# Patient Record
Sex: FEMALE | Race: WHITE | NOT HISPANIC OR LATINO | Employment: FULL TIME | URBAN - METROPOLITAN AREA
[De-identification: names, ages, dates, MRNs, and addresses within clinical notes are randomized per-mention and may not be internally consistent; named-entity substitution may affect disease eponyms.]

---

## 2019-02-22 ENCOUNTER — HOSPITAL ENCOUNTER (OUTPATIENT)
Facility: HOSPITAL | Age: 34
Discharge: HOME | End: 2019-02-22
Attending: OBSTETRICS & GYNECOLOGY | Admitting: NURSE PRACTITIONER

## 2019-02-22 VITALS
TEMPERATURE: 97.8 F | SYSTOLIC BLOOD PRESSURE: 118 MMHG | RESPIRATION RATE: 16 BRPM | BODY MASS INDEX: 29.63 KG/M2 | DIASTOLIC BLOOD PRESSURE: 64 MMHG | WEIGHT: 161 LBS | HEART RATE: 75 BPM | HEIGHT: 62 IN

## 2019-02-22 LAB
BACTERIA UR QL AUTO: ABNORMAL /HPF
BILIRUB UR QL STRIP: NEGATIVE
CLARITY UR: CLEAR
COLOR UR: YELLOW
GLUCOSE UR STRIP-MCNC: NEGATIVE MG/DL
HGB UR QL STRIP.AUTO: NEGATIVE
HYALINE CASTS UR QL AUTO: ABNORMAL /LPF
KETONES UR QL STRIP: NEGATIVE
LEUKOCYTE ESTERASE UR QL STRIP.AUTO: ABNORMAL
NITRITE UR QL STRIP: NEGATIVE
PH UR STRIP.AUTO: 7 [PH] (ref 5–8)
PROT UR QL STRIP: NEGATIVE
RBC # UR: ABNORMAL /HPF
REF LAB TEST METHOD: ABNORMAL
SP GR UR STRIP: <=1.005 (ref 1–1.03)
SQUAMOUS #/AREA URNS HPF: ABNORMAL /HPF
UROBILINOGEN UR QL STRIP: ABNORMAL
WBC UR QL AUTO: ABNORMAL /HPF

## 2019-02-22 PROCEDURE — 81001 URINALYSIS AUTO W/SCOPE: CPT | Performed by: NURSE PRACTITIONER

## 2019-02-22 PROCEDURE — 87086 URINE CULTURE/COLONY COUNT: CPT | Performed by: NURSE PRACTITIONER

## 2019-02-22 PROCEDURE — 99203 OFFICE O/P NEW LOW 30 MIN: CPT | Performed by: OBSTETRICS & GYNECOLOGY

## 2019-02-22 PROCEDURE — 59025 FETAL NON-STRESS TEST: CPT

## 2019-02-22 PROCEDURE — 59025 FETAL NON-STRESS TEST: CPT | Performed by: OBSTETRICS & GYNECOLOGY

## 2019-02-22 PROCEDURE — G0463 HOSPITAL OUTPT CLINIC VISIT: HCPCS

## 2019-02-22 RX ORDER — SODIUM CHLORIDE, SODIUM LACTATE, POTASSIUM CHLORIDE, CALCIUM CHLORIDE 600; 310; 30; 20 MG/100ML; MG/100ML; MG/100ML; MG/100ML
125 INJECTION, SOLUTION INTRAVENOUS CONTINUOUS
Status: DISCONTINUED | OUTPATIENT
Start: 2019-02-22 | End: 2019-02-22 | Stop reason: HOSPADM

## 2019-02-22 RX ORDER — PRENATAL WITH FERROUS FUM AND FOLIC ACID 3080; 920; 120; 400; 22; 1.84; 3; 20; 10; 1; 12; 200; 27; 25; 2 [IU]/1; [IU]/1; MG/1; [IU]/1; MG/1; MG/1; MG/1; MG/1; MG/1; MG/1; UG/1; MG/1; MG/1; MG/1; MG/1
1 TABLET ORAL DAILY
COMMUNITY

## 2019-02-22 RX ADMIN — SODIUM CHLORIDE, POTASSIUM CHLORIDE, SODIUM LACTATE AND CALCIUM CHLORIDE 500 ML: 600; 310; 30; 20 INJECTION, SOLUTION INTRAVENOUS at 19:03

## 2019-02-22 NOTE — H&P
History and Physical:    Subjective     Cramping, possible ctx    Mckenna Alcala is a 33 y.o. year old  with an Estimated Date of Delivery: 3/28/19 currently at 35w1d presenting with cramping that started early this morning. She has had the feeling of needing to have a BM. Denies lof, vag bleeding, dysuria. In office today, NST reactive, ctx q 2-3 min.     Prenatal care has been with Luz Jenkins MD.          Review of Systems  Pertinent items are noted in HPI.     No past medical history on file.  No past surgical history on file.  No family history on file.  Social History     Tobacco Use   • Smoking status: Not on file   Substance Use Topics   • Alcohol use: Not on file   • Drug use: Not on file     No medications prior to admission.     Allergies:  Patient has no known allergies.        Physical Exam    General:  No acute distress           Abdomen: Gravid, nontender       FHT's: reactive    Cervix: 1-2/50/-2   [place]: Contraction are q 2-3 min     Lab Review   External Prenatal Results     Pregnancy Outside Results - Transcribed From Office Records - See Scanned Records For Details     Test Value Date Time    Hgb 11.5 g/dL 10/03/14 0712    Hct 33.9 % 10/03/14 0712    ABO       Rh       Antibody Screen       Glucose Fasting GTT       Glucose Tolerance Test 1 hour       Glucose Tolerance Test 3 hour       Gonorrhea (discrete)       Chlamydia (discrete)       RPR       VDRL       Syphilis Antibody       Rubella       HBsAg       Herpes Simplex Virus PCR       Herpes Simplex VIrus Culture       HIV       Hep C RNA Quant PCR       Hep C Antibody       AFP       Group B Strep       GBS Susceptibility to Clindamycin       GBS Susceptibility to Erythromycin       Fetal Fibronectin       Genetic Testing, Maternal Blood             Drug Screening     Test Value Date Time    Urine Drug Screen       Amphetamine Screen       Barbiturate Screen       Benzodiazepine Screen       Methadone Screen       Phencyclidine  Screen       Opiates Screen       THC Screen       Cocaine Screen       Propoxyphene Screen       Buprenorphine Screen       Methamphetamine Screen       Oxycodone Screen       Tricyclic Antidepressants Screen                       Assessment/Plan     ASSESSMENT  1. IUP at 35w1d  2. Rule out labor     PLAN   1.   Admit to labor and delivery, monitor, fluids         Griselda Green, APRN  2/22/2019@

## 2019-02-23 NOTE — H&P
[facility]  Obstetric History and Physical    Chief Complaint   Patient presents with   • Contractions       Subjective     Patient is a 33 y.o. female  currently at 35w1d, who presents with complaints of  contractions.  She was sent from the office with the above complaints.  She denies bleeding or leakage of fluid.  No urinary tract symptoms, recent trauma or intercourse.    Her prenatal care is benign to date. Her previous obstetric/gynecological history is unremarkable.  She had a prior term vaginal delivery.    The following portions of the patients history were reviewed and updated as appropriate: current medications, allergies, past medical history, past surgical history, past family history, past social history and problem list .        Prenatal Information:   Maternal Prenatal Labs  Blood Type No results found for: ABO   Rh Status No results found for: RH   Antibody Screen No results found for: ABSCRN   Gonnorhea No results found for: GCCX   Chlamydia No results found for: CLAMYDCU   RPR No results found for: RPR   Syphilis Antibody No results found for: SYPHILIS   Rubella No results found for: RUBELLAIGGIN   Hepatitis B Surface Antigen No results found for: HEPBSAG   HIV-1 Antibody No results found for: LABHIV1   Hepatitis C Antibody No results found for: HEPCAB   Rapid Urin Drug Screen No results found for: AMPMETHU, BARBITSCNUR, LABBENZSCN, LABMETHSCN, LABOPIASCN, THCURSCR, COCAINEUR, AMPHETSCREEN, PROPOXSCN, BUPRENORSCNU, METAMPSCNUR, OXYCODONESCN, TRICYCLICSCN   Group B Strep Culture No results found for: GBSANTIGEN           External Prenatal Results     Pregnancy Outside Results - Transcribed From Office Records - See Scanned Records For Details     Test Value Date Time    Hgb 11.5 g/dL 10/03/14 0712    Hct 33.9 % 10/03/14 0712    ABO       Rh       Antibody Screen       Glucose Fasting GTT       Glucose Tolerance Test 1 hour       Glucose Tolerance Test 3 hour       Gonorrhea  (discrete)       Chlamydia (discrete)       RPR       VDRL       Syphilis Antibody       Rubella       HBsAg       Herpes Simplex Virus PCR       Herpes Simplex VIrus Culture       HIV       Hep C RNA Quant PCR       Hep C Antibody       AFP       Group B Strep       GBS Susceptibility to Clindamycin       GBS Susceptibility to Erythromycin       Fetal Fibronectin       Genetic Testing, Maternal Blood             Drug Screening     Test Value Date Time    Urine Drug Screen       Amphetamine Screen       Barbiturate Screen       Benzodiazepine Screen       Methadone Screen       Phencyclidine Screen       Opiates Screen       THC Screen       Cocaine Screen       Propoxyphene Screen       Buprenorphine Screen       Methamphetamine Screen       Oxycodone Screen       Tricyclic Antidepressants Screen                     Past OB History:       Obstetric History       T1      L1     SAB0   TAB0   Ectopic0   Molar0   Multiple0   Live Births1       # Outcome Date GA Lbr Nemesio/2nd Weight Sex Delivery Anes PTL Lv   2 Current            1 Term 10/02/14 39w6d  3033 g (6 lb 11 oz) F Vag-Spont EPI N LAURIE          Past Medical History:  Past Medical History:   Diagnosis Date   • Anxiety    • Heart murmur    • Hernia, inguinal    • IBS (irritable bowel syndrome)       Past Surgical History Past Surgical History:   Procedure Laterality Date   • INGUINAL HERNIA REPAIR        Family History: History reviewed. No pertinent family history.   Social History:  reports that  has never smoked. she has never used smokeless tobacco.   reports that she does not drink alcohol.   reports that she does not use drugs.   Allergies: Patient has no known allergies.  Current Medications:          No current facility-administered medications on file prior to encounter.      Current Outpatient Medications on File Prior to Encounter   Medication Sig Dispense Refill   • Prenatal Vit-Fe Fumarate-FA (PRENATAL ) 27-1 MG tablet  tablet Take 1 tablet by mouth Daily.         General ROS: Pertinent items are noted in HPI, all other systems reviewed and negative    Objective       Vital Signs Range for the last 24 hours  Temperature: Temp:  [97.8 °F (36.6 °C)] 97.8 °F (36.6 °C)   Temp Source: Temp src: Oral   BP: BP: (118)/(64) 118/64   Pulse: Heart Rate:  [75] 75   Respirations: Resp:  [16] 16   SPO2:     O2 Amount (l/min):     O2 Devices     Weight: Weight:  [73 kg (161 lb)] 73 kg (161 lb)     Physical Examination: General appearance - alert, well appearing, and in no distress, oriented to person, place, and time and normal appearing weight  Mental status - alert, oriented to person, place, and time, normal mood, behavior, speech, dress, motor activity, and thought processes  Eyes - pupils equal and reactive, extraocular eye movements intact, sclera anicteric  Mouth - mucous membranes moist, pharynx normal without lesions and dental hygiene good  Neck - supple, no significant adenopathy, thyroid exam: thyroid is normal in size without nodules or tenderness  Chest - clear to auscultation, no wheezes, rales or rhonchi, symmetric air entry  Heart - normal rate, regular rhythm, normal S1, S2, no murmurs, rubs, clicks or gallops  Abdomen-soft, nontender, nondistended, no masses or organomegaly   Abdomen, Non-Tender  Pelvic - VULVA: normal appearing vulva with no masses, tenderness or lesions, CERVIX: Patulous external office.  Internal loss closed.  The cervix remains very high and is of medium texture.  Neurological - alert, oriented, normal speech, no focal findings or movement disorder noted, DTR's normal and symmetric  Extremities - no pedal edema noted    Fetal Heart Rate Assessment  Indication: Complaints of  contractions   Start Time:                 end Time:    NST Results: Reactive NST.  Baseline fetal heart rate 125-135 bpm.  Average variability.  Multiple 15 x 15 accelerations.  No decelerations.  Contractions  "were initially noted every 2-4 minutes.  However, these is subsequently spaced following IV fluid hydration.        Laboratory Results:   No results found for: ALKPHOS, ALT, AST, CREATININE, BILITOT, LDH, URICACID    No results found for: WBC, RBC, HGB, HCT, MCV, MCH, MCHC, RDW, RDWSD, MPV, PLT, NEUTRORELPCT, LYMPHORELPCT, MONORELPCT, EOSRELPCT, BASORELPCT, AUTOIGPER, NEUTROABS, LYMPHSABS, MONOSABS, EOSABS, BASOSABS, AUTOIGNUM, NRBC          Brief Urine Lab Results     None            Radiology Review: **No new studies*  Other Studies: None    Assessment/Plan       * No active hospital problems. *        Assessment:  1. **False labor at 35 weeks 1 day gestation.*    Plan:  1. Discharge to home.  2. Signs/symptoms of  labor reviewed.  3. Patient encouraged to force fluids.  4. Keep regularly scheduled follow-up appointment with Dr. Bravo as scheduled.     Total time spent today with Mckenna  was 25 minutes (level 2).  Off this time, > 50% was spent face-to-face time coordinating care, answering her questions and counseling regarding pathophysiology of her presenting problem along with plans for any diagnostic work-up and treatment.        Carlos Eduardo Stevens \"Monet\" DOMINICK Portillo MD  2019  7:42 PM    "

## 2019-02-24 LAB — BACTERIA SPEC AEROBE CULT: NORMAL

## 2019-02-26 ENCOUNTER — HOSPITAL ENCOUNTER (OUTPATIENT)
Facility: HOSPITAL | Age: 34
Discharge: HOME | End: 2019-02-26
Attending: OBSTETRICS & GYNECOLOGY | Admitting: OBSTETRICS & GYNECOLOGY

## 2019-02-26 ENCOUNTER — APPOINTMENT (OUTPATIENT)
Dept: WOMENS IMAGING | Facility: HOSPITAL | Age: 34
End: 2019-02-26

## 2019-02-26 VITALS
SYSTOLIC BLOOD PRESSURE: 120 MMHG | BODY MASS INDEX: 29.63 KG/M2 | HEIGHT: 62 IN | RESPIRATION RATE: 18 BRPM | TEMPERATURE: 98.4 F | DIASTOLIC BLOOD PRESSURE: 67 MMHG | WEIGHT: 161 LBS | HEART RATE: 93 BPM

## 2019-02-26 LAB
BACTERIA UR QL AUTO: ABNORMAL /HPF
BILIRUB UR QL STRIP: NEGATIVE
CLARITY UR: CLEAR
COLOR UR: YELLOW
GLUCOSE UR STRIP-MCNC: NEGATIVE MG/DL
HGB UR QL STRIP.AUTO: NEGATIVE
HYALINE CASTS UR QL AUTO: ABNORMAL /LPF
KETONES UR QL STRIP: NEGATIVE
LEUKOCYTE ESTERASE UR QL STRIP.AUTO: ABNORMAL
NITRITE UR QL STRIP: NEGATIVE
PH UR STRIP.AUTO: 6.5 [PH] (ref 5–8)
PROT UR QL STRIP: NEGATIVE
RBC # UR: ABNORMAL /HPF
REF LAB TEST METHOD: ABNORMAL
SP GR UR STRIP: 1.01 (ref 1–1.03)
SQUAMOUS #/AREA URNS HPF: ABNORMAL /HPF
UROBILINOGEN UR QL STRIP: ABNORMAL
WBC UR QL AUTO: ABNORMAL /HPF

## 2019-02-26 PROCEDURE — 59025 FETAL NON-STRESS TEST: CPT

## 2019-02-26 PROCEDURE — 76811 OB US DETAILED SNGL FETUS: CPT | Performed by: OBSTETRICS & GYNECOLOGY

## 2019-02-26 PROCEDURE — 81001 URINALYSIS AUTO W/SCOPE: CPT | Performed by: OBSTETRICS & GYNECOLOGY

## 2019-02-26 PROCEDURE — 76811 OB US DETAILED SNGL FETUS: CPT

## 2019-02-26 PROCEDURE — G0463 HOSPITAL OUTPT CLINIC VISIT: HCPCS

## 2019-02-26 NOTE — POST-PROCEDURE NOTE
Documentation of the ultasound findings, images, and interpretations with be available in the patient's Viewpoint report located in the Chart Review Imaging tab in EP[facility].    Brief Ultrasound Note [place]    Single viable intrauterine pregnancy in the cephalic presentation.  The amniotic fluid volume is normal  Amniotic Fluid Index (JOSE MANUEL) equals 14.2 cm.  Biophysical Profile (BPP) equals 8/8  No obvious ultrasound evidence of placental abruption or placenta previa

## 2019-02-26 NOTE — DISCHARGE SUMMARY
Date of Discharge:  2/26/2019    Discharge Diagnosis: Hx bleeding now resolved    Presenting Problem/History of Present Illness  PREGNANCY     One episode of bleeding  Hospital Course  Patient is a 33 y.o. female presented with Bleeding but no evidence here. US nl    Procedures Performed         Consults:   Consults     No orders found from 1/28/2019 to 2/27/2019.          Pertinent Test Results:     Condition on Discharge:  good    Vital Signs  Temp:  [98.4 °F (36.9 °C)] 98.4 °F (36.9 °C)  Heart Rate:  [78-93] 93  Resp:  [18] 18  BP: (111-120)/(67-70) 120/67    Physical Exam:     General Appearance:    Alert, cooperative, in no acute distress   Head:    Normocephalic, without obvious abnormality, atraumatic   Eyes:            Lids and lashes normal, conjunctivae and sclerae normal, no   icterus, no pallor, corneas clear, PERRLA   Ears:    Ears appear intact with no abnormalities noted   Throat:   No oral lesions, no thrush, oral mucosa moist   Neck:   No adenopathy, supple, trachea midline, no thyromegaly, no     carotid bruit, no JVD   Back:     No kyphosis present, no scoliosis present, no skin lesions,       erythema or scars, no tenderness to percussion or                   palpation,   range of motion normal   Lungs:     Clear to auscultation,respirations regular, even and                   unlabored    Heart:    Regular rhythm and normal rate, normal S1 and S2, no            murmur, no gallop, no rub, no click   Breast Exam:    Deferred   Abdomen:     Normal bowel sounds, no masses, no organomegaly, soft        non-tender, non-distended, no guarding, no rebound                 tenderness   Genitalia:    Deferred   Extremities:   Moves all extremities well, no edema, no cyanosis, no              redness   Pulses:   Pulses palpable and equal bilaterally   Skin:   No bleeding, bruising or rash   Lymph nodes:   No palpable adenopathy   Neurologic:   Cranial nerves 2 - 12 grossly intact, sensation intact, DTR         present and equal bilaterally       Discharge Disposition  Home or Self Care    Discharge Medications     Discharge Medications      Continue These Medications      Instructions Start Date   Prenatal 27-1 27-1 MG tablet tablet   1 tablet, Oral, Daily             Discharge Diet:     Activity at Discharge:     Follow-up Appointments  No future appointments.      Test Results Pending at Discharge       Luz Jenkins MD  02/26/19  1:23 PM    Time: Discharge 10 min

## 2019-02-26 NOTE — H&P
[facility]  Obstetric History and Physical    Chief Complaint   Patient presents with   • Vaginal Bleeding       Subjective     Patient is a 33 y.o. female  currently at 35w5d, who presents with one episode of vaginal bleeding with no pain, good fetal movement.    Her prenatal care is benign.  Her previous obstetric/gynecological history is noted for is non-contributory.    The following portions of the patients history were reviewed and updated as appropriate: current medications, allergies, past medical history, past surgical history, past family history, past social history and problem list .       Prenatal Information:   Maternal Prenatal Labs  Blood Type No results found for: ABO   Rh Status No results found for: RH   Antibody Screen No results found for: ABSCRN   Gonnorhea No results found for: GCCX   Chlamydia No results found for: CLAMYDCU   RPR No results found for: RPR   Syphilis Antibody No results found for: SYPHILIS   Rubella No results found for: RUBELLAIGGIN   Hepatitis B Surface Antigen No results found for: HEPBSAG   HIV-1 Antibody No results found for: LABHIV1   Hepatitis C Antibody No results found for: HEPCAB   Rapid Urin Drug Screen No results found for: AMPMETHU, BARBITSCNUR, LABBENZSCN, LABMETHSCN, LABOPIASCN, THCURSCR, COCAINEUR, AMPHETSCREEN, PROPOXSCN, BUPRENORSCNU, METAMPSCNUR, OXYCODONESCN, TRICYCLICSCN   Group B Strep Culture No results found for: GBSANTIGEN           External Prenatal Results     Pregnancy Outside Results - Transcribed From Office Records - See Scanned Records For Details     Test Value Date Time    Hgb 11.5 g/dL 10/03/14 0712    Hct 33.9 % 10/03/14 0712    ABO       Rh       Antibody Screen       Glucose Fasting GTT       Glucose Tolerance Test 1 hour       Glucose Tolerance Test 3 hour       Gonorrhea (discrete)       Chlamydia (discrete)       RPR       VDRL       Syphilis Antibody       Rubella       HBsAg       Herpes Simplex Virus PCR       Herpes Simplex  VIrus Culture       HIV       Hep C RNA Quant PCR       Hep C Antibody       AFP       Group B Strep       GBS Susceptibility to Clindamycin       GBS Susceptibility to Erythromycin       Fetal Fibronectin       Genetic Testing, Maternal Blood             Drug Screening     Test Value Date Time    Urine Drug Screen       Amphetamine Screen       Barbiturate Screen       Benzodiazepine Screen       Methadone Screen       Phencyclidine Screen       Opiates Screen       THC Screen       Cocaine Screen       Propoxyphene Screen       Buprenorphine Screen       Methamphetamine Screen       Oxycodone Screen       Tricyclic Antidepressants Screen                     Past OB History:     Obstetric History       T1      L1     SAB0   TAB0   Ectopic0   Molar0   Multiple0   Live Births1       # Outcome Date GA Lbr Nemesio/2nd Weight Sex Delivery Anes PTL Lv   2 Current            1 Term 10/02/14 39w6d  3033 g (6 lb 11 oz) F Vag-Spont EPI N LAURIE          Past Medical History: Past Medical History:   Diagnosis Date   • Anxiety    • Heart murmur    • Hernia, inguinal    • IBS (irritable bowel syndrome)       Past Surgical History Past Surgical History:   Procedure Laterality Date   • INGUINAL HERNIA REPAIR        Family History: History reviewed. No pertinent family history.   Social History:  reports that  has never smoked. she has never used smokeless tobacco.   reports that she does not drink alcohol.   reports that she does not use drugs.        Review of Systems  Denies fever, HA, CP, Shortness of air, muscle weakness,                                             and rashes      Objective     Vital Signs Range for the last 24 hours  Temperature: Temp:  [98.4 °F (36.9 °C)] 98.4 °F (36.9 °C)   Temp Source: Temp src: Oral   BP: BP: (111)/(70) 111/70   Pulse: Heart Rate:  [78] 78   Respirations: Resp:  [18] 18   SPO2:     O2 Amount (l/min):     O2 Devices     Weight: Weight:  [73 kg (161 lb)] 73 kg (161 lb)      Physical Examination: General appearance - alert, well appearing, and in no distress and oriented to person, place, and time  Chest - clear to auscultation, no wheezes, rales or rhonchi, symmetric air entry  Heart - S1 and S2 normal, no murmurs noted  Abdomen - soft, nontender, nondistended, no masses or organomegaly  no rebound tenderness noted  bowel sounds normal  No guarding, No RUQ pain  Extremities - pedal edema  +, DTR     Presentation: vtx   Cervix: Exam by: Method: sterile exam per physician   Dilation:     Effacement:     Station:       Fetal Heart Rate Assessment   Method: Fetal HR Assessment Method: external   Beats/min: Fetal HR (beats/min): 140   Baseline: Fetal Heart Baseline Rate: normal range   Varibility: Fetal HR Variability: moderate (amplitude range 6 to 25 bpm)   Accels: Fetal HR Accelerations: greater than/equal to 15 bpm, lasting at least 15 seconds   Decels: Fetal HR Decelerations: absent   Tracing Category:       Uterine Assessment   Method: Method: external tocotransducer   Frequency (min):     Ctx Count in 10 min:     Duration:     Intensity: Contraction Intensity: no contractions   Intensity by IUPC:     Resting Tone: Uterine Resting Tone: soft by palpation   Resting Tone by IUPC:     [place] Units:      NST                     Indication  Start time                  End time  15 x 15 accels x 2  Yes  No decels  Baseline  Fetal Heart Baseline Rate: normal range  Reactive NST - Yes     Laboratory Results: [unfilled]  Radiology Review:   Other Studies: PDC US nl         Assessment:  1. Intrauterine pregnancy at 35w5d weeks gestation with reactive fetal status  2.  US nl and cx closed and no bleeding seen  3.    4.  GBS status: No results found for: GBSANTIGEN    Plan:  1.   2.   3.    4.  All questions have been answered.  5.  Will monitor and if reactive will d/c in 2 hours to rtc office as planned      Luz Jenkins MD  2/26/2019  8:47 AM

## 2019-03-01 ENCOUNTER — TRANSCRIBE ORDERS (OUTPATIENT)
Dept: LAB | Facility: HOSPITAL | Age: 34
End: 2019-03-01

## 2019-03-01 ENCOUNTER — LAB (OUTPATIENT)
Dept: LAB | Facility: HOSPITAL | Age: 34
End: 2019-03-01

## 2019-03-01 DIAGNOSIS — Z34.83 PRENATAL CARE, SUBSEQUENT PREGNANCY, THIRD TRIMESTER: Primary | ICD-10-CM

## 2019-03-01 DIAGNOSIS — Z34.83 PRENATAL CARE, SUBSEQUENT PREGNANCY, THIRD TRIMESTER: ICD-10-CM

## 2019-03-01 PROCEDURE — 87081 CULTURE SCREEN ONLY: CPT

## 2019-03-04 LAB — BACTERIA SPEC AEROBE CULT: NORMAL

## 2019-03-28 ENCOUNTER — ANESTHESIA (OUTPATIENT)
Dept: LABOR AND DELIVERY | Facility: HOSPITAL | Age: 34
End: 2019-03-28

## 2019-03-28 ENCOUNTER — ANESTHESIA EVENT (OUTPATIENT)
Dept: LABOR AND DELIVERY | Facility: HOSPITAL | Age: 34
End: 2019-03-28

## 2019-03-28 ENCOUNTER — HOSPITAL ENCOUNTER (INPATIENT)
Dept: LABOR AND DELIVERY | Facility: HOSPITAL | Age: 34
LOS: 2 days | Discharge: HOME | End: 2019-03-30
Attending: OBSTETRICS & GYNECOLOGY | Admitting: OBSTETRICS & GYNECOLOGY

## 2019-03-28 PROBLEM — Z34.90 CURRENTLY PREGNANT: Status: ACTIVE | Noted: 2019-03-28

## 2019-03-28 LAB
ABO GROUP BLD: NORMAL
ALP SERPL-CCNC: 129 U/L (ref 25–100)
ALT SERPL W P-5'-P-CCNC: 13 U/L (ref 7–40)
AST SERPL-CCNC: 25 U/L (ref 0–33)
BILIRUB SERPL-MCNC: 0.4 MG/DL (ref 0.3–1.2)
BLD GP AB SCN SERPL QL: NEGATIVE
CREAT BLD-MCNC: 0.59 MG/DL (ref 0.6–1.3)
DEPRECATED RDW RBC AUTO: 43.7 FL (ref 37–54)
ERYTHROCYTE [DISTWIDTH] IN BLOOD BY AUTOMATED COUNT: 14.1 % (ref 11.3–14.5)
HCT VFR BLD AUTO: 37.7 % (ref 34.5–44)
HGB BLD-MCNC: 12.4 G/DL (ref 11.5–15.5)
LDH SERPL-CCNC: 229 U/L (ref 120–246)
MCH RBC QN AUTO: 28.1 PG (ref 27–31)
MCHC RBC AUTO-ENTMCNC: 32.9 G/DL (ref 32–36)
MCV RBC AUTO: 85.3 FL (ref 80–99)
PLATELET # BLD AUTO: 205 10*3/MM3 (ref 150–450)
PMV BLD AUTO: 11.8 FL (ref 6–12)
RBC # BLD AUTO: 4.42 10*6/MM3 (ref 3.89–5.14)
RH BLD: POSITIVE
T&S EXPIRATION DATE: NORMAL
URATE SERPL-MCNC: 5.7 MG/DL (ref 3.1–7.8)
WBC NRBC COR # BLD: 10.26 10*3/MM3 (ref 3.5–10.8)

## 2019-03-28 PROCEDURE — 86901 BLOOD TYPING SEROLOGIC RH(D): CPT | Performed by: OBSTETRICS & GYNECOLOGY

## 2019-03-28 PROCEDURE — 84550 ASSAY OF BLOOD/URIC ACID: CPT | Performed by: OBSTETRICS & GYNECOLOGY

## 2019-03-28 PROCEDURE — 82247 BILIRUBIN TOTAL: CPT | Performed by: OBSTETRICS & GYNECOLOGY

## 2019-03-28 PROCEDURE — 86900 BLOOD TYPING SEROLOGIC ABO: CPT | Performed by: OBSTETRICS & GYNECOLOGY

## 2019-03-28 PROCEDURE — 51701 INSERT BLADDER CATHETER: CPT

## 2019-03-28 PROCEDURE — 86850 RBC ANTIBODY SCREEN: CPT | Performed by: OBSTETRICS & GYNECOLOGY

## 2019-03-28 PROCEDURE — 82565 ASSAY OF CREATININE: CPT | Performed by: OBSTETRICS & GYNECOLOGY

## 2019-03-28 PROCEDURE — 25010000002 ROPIVACAINE PER 1 MG: Performed by: NURSE ANESTHETIST, CERTIFIED REGISTERED

## 2019-03-28 PROCEDURE — C1755 CATHETER, INTRASPINAL: HCPCS

## 2019-03-28 PROCEDURE — 85027 COMPLETE CBC AUTOMATED: CPT | Performed by: OBSTETRICS & GYNECOLOGY

## 2019-03-28 PROCEDURE — C1755 CATHETER, INTRASPINAL: HCPCS | Performed by: ANESTHESIOLOGY

## 2019-03-28 PROCEDURE — 83615 LACTATE (LD) (LDH) ENZYME: CPT | Performed by: OBSTETRICS & GYNECOLOGY

## 2019-03-28 PROCEDURE — 84075 ASSAY ALKALINE PHOSPHATASE: CPT | Performed by: OBSTETRICS & GYNECOLOGY

## 2019-03-28 PROCEDURE — 0UQGXZZ REPAIR VAGINA, EXTERNAL APPROACH: ICD-10-PCS | Performed by: OBSTETRICS & GYNECOLOGY

## 2019-03-28 PROCEDURE — 25010000002 FENTANYL CITRATE (PF) 100 MCG/2ML SOLUTION: Performed by: NURSE ANESTHETIST, CERTIFIED REGISTERED

## 2019-03-28 PROCEDURE — 84460 ALANINE AMINO (ALT) (SGPT): CPT | Performed by: OBSTETRICS & GYNECOLOGY

## 2019-03-28 PROCEDURE — 59025 FETAL NON-STRESS TEST: CPT

## 2019-03-28 PROCEDURE — 84450 TRANSFERASE (AST) (SGOT): CPT | Performed by: OBSTETRICS & GYNECOLOGY

## 2019-03-28 RX ORDER — SODIUM CHLORIDE 0.9 % (FLUSH) 0.9 %
3-10 SYRINGE (ML) INJECTION AS NEEDED
Status: DISCONTINUED | OUTPATIENT
Start: 2019-03-28 | End: 2019-03-28 | Stop reason: HOSPADM

## 2019-03-28 RX ORDER — PROMETHAZINE HYDROCHLORIDE 25 MG/ML
12.5 INJECTION, SOLUTION INTRAMUSCULAR; INTRAVENOUS EVERY 6 HOURS PRN
Status: DISCONTINUED | OUTPATIENT
Start: 2019-03-28 | End: 2019-03-28 | Stop reason: HOSPADM

## 2019-03-28 RX ORDER — DOCUSATE SODIUM 100 MG/1
100 CAPSULE, LIQUID FILLED ORAL 2 TIMES DAILY
Status: DISCONTINUED | OUTPATIENT
Start: 2019-03-28 | End: 2019-03-30 | Stop reason: HOSPADM

## 2019-03-28 RX ORDER — MORPHINE SULFATE 2 MG/ML
2 INJECTION, SOLUTION INTRAMUSCULAR; INTRAVENOUS
Status: DISCONTINUED | OUTPATIENT
Start: 2019-03-28 | End: 2019-03-28 | Stop reason: HOSPADM

## 2019-03-28 RX ORDER — LIDOCAINE HYDROCHLORIDE AND EPINEPHRINE 15; 5 MG/ML; UG/ML
INJECTION, SOLUTION EPIDURAL AS NEEDED
Status: DISCONTINUED | OUTPATIENT
Start: 2019-03-28 | End: 2019-03-28 | Stop reason: SURG

## 2019-03-28 RX ORDER — OXYTOCIN-SODIUM CHLORIDE 0.9% IV SOLN 30 UNIT/500ML 30-0.9/5 UT/ML-%
85 SOLUTION INTRAVENOUS ONCE
Status: DISCONTINUED | OUTPATIENT
Start: 2019-03-28 | End: 2019-03-28 | Stop reason: HOSPADM

## 2019-03-28 RX ORDER — METHYLERGONOVINE MALEATE 0.2 MG/ML
200 INJECTION INTRAVENOUS ONCE AS NEEDED
Status: DISCONTINUED | OUTPATIENT
Start: 2019-03-28 | End: 2019-03-28 | Stop reason: HOSPADM

## 2019-03-28 RX ORDER — PROMETHAZINE HYDROCHLORIDE 25 MG/1
25 TABLET ORAL EVERY 6 HOURS PRN
Status: DISCONTINUED | OUTPATIENT
Start: 2019-03-28 | End: 2019-03-30 | Stop reason: HOSPADM

## 2019-03-28 RX ORDER — PROMETHAZINE HYDROCHLORIDE 12.5 MG/1
12.5 TABLET ORAL EVERY 6 HOURS PRN
Status: DISCONTINUED | OUTPATIENT
Start: 2019-03-28 | End: 2019-03-28 | Stop reason: HOSPADM

## 2019-03-28 RX ORDER — ROPIVACAINE HYDROCHLORIDE 2 MG/ML
14 INJECTION, SOLUTION EPIDURAL; INFILTRATION; PERINEURAL CONTINUOUS
Status: DISCONTINUED | OUTPATIENT
Start: 2019-03-28 | End: 2019-03-29

## 2019-03-28 RX ORDER — FENTANYL CITRATE 50 UG/ML
INJECTION, SOLUTION INTRAMUSCULAR; INTRAVENOUS AS NEEDED
Status: DISCONTINUED | OUTPATIENT
Start: 2019-03-28 | End: 2019-03-28 | Stop reason: SURG

## 2019-03-28 RX ORDER — EPHEDRINE SULFATE/0.9% NACL/PF 25 MG/5 ML
10 SYRINGE (ML) INTRAVENOUS
Status: DISCONTINUED | OUTPATIENT
Start: 2019-03-28 | End: 2019-03-28 | Stop reason: HOSPADM

## 2019-03-28 RX ORDER — SODIUM CHLORIDE 0.9 % (FLUSH) 0.9 %
3 SYRINGE (ML) INJECTION EVERY 12 HOURS SCHEDULED
Status: DISCONTINUED | OUTPATIENT
Start: 2019-03-28 | End: 2019-03-28 | Stop reason: HOSPADM

## 2019-03-28 RX ORDER — METOCLOPRAMIDE HYDROCHLORIDE 5 MG/ML
10 INJECTION INTRAMUSCULAR; INTRAVENOUS ONCE AS NEEDED
Status: DISCONTINUED | OUTPATIENT
Start: 2019-03-28 | End: 2019-03-28 | Stop reason: HOSPADM

## 2019-03-28 RX ORDER — ACETAMINOPHEN 325 MG/1
650 TABLET ORAL EVERY 4 HOURS PRN
Status: DISCONTINUED | OUTPATIENT
Start: 2019-03-28 | End: 2019-03-28 | Stop reason: HOSPADM

## 2019-03-28 RX ORDER — DIPHENHYDRAMINE HYDROCHLORIDE 50 MG/ML
12.5 INJECTION INTRAMUSCULAR; INTRAVENOUS EVERY 8 HOURS PRN
Status: DISCONTINUED | OUTPATIENT
Start: 2019-03-28 | End: 2019-03-28 | Stop reason: HOSPADM

## 2019-03-28 RX ORDER — TRISODIUM CITRATE DIHYDRATE AND CITRIC ACID MONOHYDRATE 500; 334 MG/5ML; MG/5ML
30 SOLUTION ORAL ONCE
Status: DISCONTINUED | OUTPATIENT
Start: 2019-03-28 | End: 2019-03-28 | Stop reason: HOSPADM

## 2019-03-28 RX ORDER — OXYTOCIN-SODIUM CHLORIDE 0.9% IV SOLN 30 UNIT/500ML 30-0.9/5 UT/ML-%
650 SOLUTION INTRAVENOUS ONCE
Status: COMPLETED | OUTPATIENT
Start: 2019-03-28 | End: 2019-03-28

## 2019-03-28 RX ORDER — CARBOPROST TROMETHAMINE 250 UG/ML
250 INJECTION, SOLUTION INTRAMUSCULAR AS NEEDED
Status: DISCONTINUED | OUTPATIENT
Start: 2019-03-28 | End: 2019-03-28 | Stop reason: HOSPADM

## 2019-03-28 RX ORDER — OXYCODONE HYDROCHLORIDE AND ACETAMINOPHEN 5; 325 MG/1; MG/1
1 TABLET ORAL EVERY 4 HOURS PRN
Status: DISCONTINUED | OUTPATIENT
Start: 2019-03-28 | End: 2019-03-30 | Stop reason: HOSPADM

## 2019-03-28 RX ORDER — PROMETHAZINE HYDROCHLORIDE 12.5 MG/1
12.5 SUPPOSITORY RECTAL EVERY 6 HOURS PRN
Status: DISCONTINUED | OUTPATIENT
Start: 2019-03-28 | End: 2019-03-28 | Stop reason: HOSPADM

## 2019-03-28 RX ORDER — BISACODYL 10 MG
10 SUPPOSITORY, RECTAL RECTAL DAILY PRN
Status: DISCONTINUED | OUTPATIENT
Start: 2019-03-29 | End: 2019-03-30 | Stop reason: HOSPADM

## 2019-03-28 RX ORDER — ONDANSETRON 2 MG/ML
4 INJECTION INTRAMUSCULAR; INTRAVENOUS ONCE AS NEEDED
Status: DISCONTINUED | OUTPATIENT
Start: 2019-03-28 | End: 2019-03-28 | Stop reason: HOSPADM

## 2019-03-28 RX ORDER — OXYCODONE AND ACETAMINOPHEN 7.5; 325 MG/1; MG/1
2 TABLET ORAL EVERY 4 HOURS PRN
Status: DISCONTINUED | OUTPATIENT
Start: 2019-03-28 | End: 2019-03-28 | Stop reason: HOSPADM

## 2019-03-28 RX ORDER — MAGNESIUM CARB/ALUMINUM HYDROX 105-160MG
30 TABLET,CHEWABLE ORAL ONCE
Status: DISCONTINUED | OUTPATIENT
Start: 2019-03-28 | End: 2019-03-28 | Stop reason: HOSPADM

## 2019-03-28 RX ORDER — LIDOCAINE HYDROCHLORIDE 10 MG/ML
5 INJECTION, SOLUTION EPIDURAL; INFILTRATION; INTRACAUDAL; PERINEURAL AS NEEDED
Status: DISCONTINUED | OUTPATIENT
Start: 2019-03-28 | End: 2019-03-28 | Stop reason: HOSPADM

## 2019-03-28 RX ORDER — SODIUM CHLORIDE, SODIUM LACTATE, POTASSIUM CHLORIDE, CALCIUM CHLORIDE 600; 310; 30; 20 MG/100ML; MG/100ML; MG/100ML; MG/100ML
125 INJECTION, SOLUTION INTRAVENOUS CONTINUOUS
Status: DISCONTINUED | OUTPATIENT
Start: 2019-03-28 | End: 2019-03-29

## 2019-03-28 RX ORDER — MISOPROSTOL 200 UG/1
800 TABLET ORAL AS NEEDED
Status: DISCONTINUED | OUTPATIENT
Start: 2019-03-28 | End: 2019-03-28 | Stop reason: HOSPADM

## 2019-03-28 RX ORDER — LANOLIN 100 %
OINTMENT (GRAM) TOPICAL AS NEEDED
Status: DISCONTINUED | OUTPATIENT
Start: 2019-03-28 | End: 2019-03-30 | Stop reason: HOSPADM

## 2019-03-28 RX ORDER — IBUPROFEN 600 MG/1
600 TABLET ORAL EVERY 6 HOURS PRN
Status: DISCONTINUED | OUTPATIENT
Start: 2019-03-28 | End: 2019-03-30 | Stop reason: HOSPADM

## 2019-03-28 RX ADMIN — ROPIVACAINE HYDROCHLORIDE 10 ML: 5 INJECTION, SOLUTION EPIDURAL; INFILTRATION; PERINEURAL at 08:55

## 2019-03-28 RX ADMIN — DOCUSATE SODIUM 100 MG: 100 CAPSULE, LIQUID FILLED ORAL at 22:04

## 2019-03-28 RX ADMIN — OXYTOCIN 650 ML/HR: 10 INJECTION INTRAVENOUS at 09:40

## 2019-03-28 RX ADMIN — FENTANYL CITRATE 100 MCG: 50 INJECTION, SOLUTION INTRAMUSCULAR; INTRAVENOUS at 08:51

## 2019-03-28 RX ADMIN — ROPIVACAINE HYDROCHLORIDE 14 ML/HR: 2 INJECTION, SOLUTION EPIDURAL; INFILTRATION at 08:57

## 2019-03-28 RX ADMIN — LIDOCAINE HYDROCHLORIDE AND EPINEPHRINE 2 ML: 15; 5 INJECTION, SOLUTION EPIDURAL at 08:51

## 2019-03-28 RX ADMIN — WITCH HAZEL 1 PAD: 500 SOLUTION RECTAL; TOPICAL at 22:04

## 2019-03-28 RX ADMIN — LIDOCAINE HYDROCHLORIDE AND EPINEPHRINE 3 ML: 15; 5 INJECTION, SOLUTION EPIDURAL at 08:48

## 2019-03-28 RX ADMIN — IBUPROFEN 600 MG: 600 TABLET, FILM COATED ORAL at 17:45

## 2019-03-28 RX ADMIN — FENTANYL CITRATE 100 MCG: 50 INJECTION, SOLUTION INTRAMUSCULAR; INTRAVENOUS at 08:57

## 2019-03-28 NOTE — ANESTHESIA PROCEDURE NOTES
Labor Epidural      Patient reassessed immediately prior to procedure    Patient location during procedure: floor  Performed By  Anesthesiologist: Barber Schulz DO  CRNA: Eugenia Bean CRNA  Preanesthetic Checklist  Completed: patient identified, surgical consent, pre-op evaluation, timeout performed, IV checked, risks and benefits discussed and monitors and equipment checked  Prep:  Pt Position:sitting  Sterile Tech:cap, gloves, mask and sterile barrier  Prep:DuraPrep  Monitoring:blood pressure monitoring  Epidural Block Procedure:  Approach:midline  Guidance:palpation technique  Needle Type:Tuohy  Needle Gauge:17 G  Loss of Resistance Medium: air  Loss of Resistance: 4cm  Cath Depth at skin:9 cm  Paresthesia: none  Aspiration:negative  Test Dose:negative  Number of Attempts: 1  Post Assessment:  Dressing:occlusive dressing applied and secured with tape  Pt Tolerance:patient tolerated the procedure well with no apparent complications  Complications:no

## 2019-03-28 NOTE — ANESTHESIA PREPROCEDURE EVALUATION
Anesthesia Evaluation     Patient summary reviewed and Nursing notes reviewed                Airway   Mallampati: II  TM distance: >3 FB  Neck ROM: full  Dental      Pulmonary - negative pulmonary ROS   Cardiovascular     (+) valvular problems/murmurs,       Neuro/Psych- negative ROS  GI/Hepatic/Renal/Endo - negative ROS     Musculoskeletal (-) negative ROS    Abdominal    Substance History - negative use     OB/GYN    (+) Pregnant,         Other - negative ROS                       Anesthesia Plan    ASA 2     epidural     Anesthetic plan, all risks, benefits, and alternatives have been provided, discussed and informed consent has been obtained with: patient.

## 2019-03-29 LAB
BASOPHILS # BLD AUTO: 0.01 10*3/MM3 (ref 0–0.2)
BASOPHILS NFR BLD AUTO: 0.1 % (ref 0–1)
DEPRECATED RDW RBC AUTO: 44.6 FL (ref 37–54)
EOSINOPHIL # BLD AUTO: 0.08 10*3/MM3 (ref 0–0.3)
EOSINOPHIL NFR BLD AUTO: 0.9 % (ref 0–3)
ERYTHROCYTE [DISTWIDTH] IN BLOOD BY AUTOMATED COUNT: 14.3 % (ref 11.3–14.5)
HCT VFR BLD AUTO: 35.4 % (ref 34.5–44)
HGB BLD-MCNC: 12 G/DL (ref 11.5–15.5)
IMM GRANULOCYTES # BLD AUTO: 0.03 10*3/MM3 (ref 0–0.05)
IMM GRANULOCYTES NFR BLD AUTO: 0.3 % (ref 0–0.6)
LYMPHOCYTES # BLD AUTO: 0.99 10*3/MM3 (ref 0.6–4.8)
LYMPHOCYTES NFR BLD AUTO: 10.6 % (ref 24–44)
MCH RBC QN AUTO: 29.1 PG (ref 27–31)
MCHC RBC AUTO-ENTMCNC: 33.9 G/DL (ref 32–36)
MCV RBC AUTO: 85.7 FL (ref 80–99)
MONOCYTES # BLD AUTO: 0.62 10*3/MM3 (ref 0–1)
MONOCYTES NFR BLD AUTO: 6.6 % (ref 0–12)
NEUTROPHILS # BLD AUTO: 7.61 10*3/MM3 (ref 1.5–8.3)
NEUTROPHILS NFR BLD AUTO: 81.5 % (ref 41–71)
PLATELET # BLD AUTO: 178 10*3/MM3 (ref 150–450)
PMV BLD AUTO: 10.9 FL (ref 6–12)
RBC # BLD AUTO: 4.13 10*6/MM3 (ref 3.89–5.14)
WBC NRBC COR # BLD: 9.34 10*3/MM3 (ref 3.5–10.8)

## 2019-03-29 PROCEDURE — 85025 COMPLETE CBC W/AUTO DIFF WBC: CPT | Performed by: OBSTETRICS & GYNECOLOGY

## 2019-03-29 RX ADMIN — IBUPROFEN 600 MG: 600 TABLET, FILM COATED ORAL at 06:39

## 2019-03-29 RX ADMIN — IBUPROFEN 600 MG: 600 TABLET, FILM COATED ORAL at 13:49

## 2019-03-29 RX ADMIN — Medication 3 APPLICATION: at 13:50

## 2019-03-29 RX ADMIN — DOCUSATE SODIUM 100 MG: 100 CAPSULE, LIQUID FILLED ORAL at 21:57

## 2019-03-29 RX ADMIN — IBUPROFEN 600 MG: 600 TABLET, FILM COATED ORAL at 23:16

## 2019-03-29 RX ADMIN — DOCUSATE SODIUM 100 MG: 100 CAPSULE, LIQUID FILLED ORAL at 08:54

## 2019-03-29 NOTE — ANESTHESIA POSTPROCEDURE EVALUATION
Patient: Mckenna Alcala    Procedure Summary     Date:  03/28/19 Room / Location:      Anesthesia Start:  0839 Anesthesia Stop:  0936    Procedure:  LABOR ANALGESIA Diagnosis:      Scheduled Providers:   Provider:  Barber Schulz DO    Anesthesia Type:  epidural ASA Status:  2          Anesthesia Type: epidural  Last vitals  BP   118/69 (03/29/19 0800)   Temp   98.4 °F (36.9 °C) (03/29/19 0800)   Pulse   88 (03/29/19 0800)   Resp   16 (03/29/19 0800)     SpO2         Post Anesthesia Care and Evaluation    Patient location during evaluation: bedside  Patient participation: complete - patient participated  Level of consciousness: awake and alert  Pain management: adequate  Airway patency: patent  Anesthetic complications: No anesthetic complications    Cardiovascular status: acceptable  Respiratory status: acceptable  Hydration status: acceptable  Post Neuraxial Block status: Motor and sensory function returned to baseline and No signs or symptoms of PDPH

## 2019-03-30 VITALS
HEART RATE: 88 BPM | WEIGHT: 164 LBS | HEIGHT: 62 IN | BODY MASS INDEX: 30.18 KG/M2 | DIASTOLIC BLOOD PRESSURE: 64 MMHG | RESPIRATION RATE: 16 BRPM | SYSTOLIC BLOOD PRESSURE: 122 MMHG | TEMPERATURE: 99.1 F

## 2019-03-30 RX ORDER — PSEUDOEPHEDRINE HCL 30 MG
100 TABLET ORAL 2 TIMES DAILY PRN
Start: 2019-03-30 | End: 2019-10-10

## 2019-03-30 RX ORDER — IBUPROFEN 600 MG/1
600 TABLET ORAL EVERY 6 HOURS PRN
Qty: 30 TABLET | Refills: 0 | OUTPATIENT
Start: 2019-03-30 | End: 2019-10-10

## 2019-03-30 RX ADMIN — DOCUSATE SODIUM 100 MG: 100 CAPSULE, LIQUID FILLED ORAL at 08:06

## 2019-03-30 RX ADMIN — WITCH HAZEL 1 PAD: 500 SOLUTION RECTAL; TOPICAL at 11:18

## 2019-03-30 RX ADMIN — IBUPROFEN 600 MG: 600 TABLET, FILM COATED ORAL at 08:06

## 2019-03-30 RX ADMIN — BENZOCAINE 1 APPLICATION: 5.6 OINTMENT TOPICAL at 11:18

## 2019-04-24 ENCOUNTER — HOSPITAL ENCOUNTER (OUTPATIENT)
Dept: LACTATION | Facility: HOSPITAL | Age: 34
Discharge: HOME | End: 2019-04-24

## 2019-04-24 NOTE — LACTATION NOTE
"Observation: Nurses Jerel in cradle hold.  Latch is painful.  In cradle hold Jerel's nose/head falls far away from the breast and he slides to the end of nipple causing a \"pinchy\" \"bitey\" feel to his latch.  When I encouraged Mom to keep baby's nose/face closer to the breast but leaving plenty of space to see that his nares are free/open for breathing, Mckenna states that it changes the way it feels to a strong tug but no \"pinchy\" \"bitey\" feeling goes away.  Encouraged to help baby onto the breast until Jerel has much better head/neck control.  This mother and baby are otherwise doing awesome with breastfeeding.  Jerel has averaged 7-9 ounces per week weight gain since birth.   "

## 2019-04-24 NOTE — LACTATION NOTE
04/24/19 1030   Maternal Information   Date of Referral 04/24/19   Person Making Referral patient;physician   Maternal Reason for Referral breastfeeding currently;latch painful   Infant Reason for Referral other (see comments)  (Weighed 7-1 at birth, today at 1 month old weighs 9-1.)   Maternal Assessment   Breast Size Issue none   Breast Shape Bilateral:;round   Breast Density Bilateral:;full   Nipples Bilateral:;everted   Left Nipple Symptoms painful;redness   Right Nipple Symptoms painful;redness   Maternal Infant Feeding   Maternal Emotional State independent;relaxed   Infant Positioning clutch/football;cross-cradle   Signs of Milk Transfer audible swallow;breasts soften with feeding   Pain with Feeding no   Comfort Measures Before/During Feeding infant position adjusted;latch adjusted;other (see comments)  (Instructed how to get a deeper latch to decrease discomfort)   Comfort Measures Following Feeding air-drying encouraged;other (see comments)  (Lanolin. Dr. Campbell's suggested if nipples become damaged.)   Nipple Shape After Feeding, Left Breast round;symmetrical   Nipple Shape After Feeding, Right round;symmetrical   Latch Assistance yes   Feeding Infant   Feeding Readiness Cues eager;rooting   Satiety Cues calm after feeding  (Has been nursing off 1 breast per feeding session.)   Feeding Tolerance/Success coordinated suck;coordinated swallow;other (see comments)  (Instructed to feed baby from both breasts each feeding sessi)   Effective Latch During Feeding yes   Suck/Swallow Coordination present   Skin-to-Skin Completed yes   Prefeeding Weight (gm) 4125 g (145.5 oz)   Postfeeding Weight (gm) 4240 g (149.6 oz)   Weight Gain/Loss (gm)  115 g (4.1 oz)   Breastfeeding Session   Breastfeeding breastfeeding, bilateral   Breastfeeding Time, Left (min) 12   Breastfeeding Time, Right (min) 15   LATCH Score   Latch 2-->grasps breast, tongue down, lips flanged, rhythmic sucking   Audible Swallowing 2-->spontaneous  and intermittent (24 hrs old)   Type of Nipple 2-->everted (after stimulation)   Comfort (Breast/Nipple) 1-->filling, red/small blisters/bruises, mild/mod discomfort   Hold (Positioning) 1-->minimal assist, teach one side, mother does other, staff holds   Latch Score 8

## 2022-08-08 ENCOUNTER — HOSPITAL ENCOUNTER (OUTPATIENT)
Dept: GENERAL RADIOLOGY | Facility: HOSPITAL | Age: 37
Discharge: HOME | End: 2022-08-08
Admitting: CLINIC/CENTER

## 2022-08-08 ENCOUNTER — TRANSCRIBE ORDERS (OUTPATIENT)
Dept: ADMINISTRATIVE | Facility: HOSPITAL | Age: 37
End: 2022-08-08

## 2022-08-08 DIAGNOSIS — R10.9 ACUTE ABDOMINAL PAIN: Primary | ICD-10-CM

## 2022-08-08 PROCEDURE — 74018 RADEX ABDOMEN 1 VIEW: CPT

## 2024-01-05 ENCOUNTER — TRANSCRIBE ORDERS (OUTPATIENT)
Dept: GENERAL RADIOLOGY | Facility: HOSPITAL | Age: 39
End: 2024-01-05
Payer: COMMERCIAL

## 2024-01-05 ENCOUNTER — HOSPITAL ENCOUNTER (OUTPATIENT)
Dept: GENERAL RADIOLOGY | Facility: HOSPITAL | Age: 39
Discharge: HOME | End: 2024-01-05
Admitting: INTERNAL MEDICINE
Payer: COMMERCIAL

## 2024-01-05 DIAGNOSIS — M54.50 LUMBAR BACK PAIN: Primary | ICD-10-CM

## 2024-01-05 DIAGNOSIS — M54.50 LUMBAR BACK PAIN: ICD-10-CM

## 2024-01-05 PROCEDURE — 72110 X-RAY EXAM L-2 SPINE 4/>VWS: CPT
